# Patient Record
Sex: MALE | Race: WHITE | NOT HISPANIC OR LATINO | Employment: FULL TIME | URBAN - METROPOLITAN AREA
[De-identification: names, ages, dates, MRNs, and addresses within clinical notes are randomized per-mention and may not be internally consistent; named-entity substitution may affect disease eponyms.]

---

## 2018-03-29 ENCOUNTER — CONSULT (OUTPATIENT)
Dept: SLEEP MEDICINE | Facility: HOSPITAL | Age: 58
End: 2018-03-29

## 2018-03-29 VITALS
HEIGHT: 72 IN | SYSTOLIC BLOOD PRESSURE: 142 MMHG | BODY MASS INDEX: 32.51 KG/M2 | WEIGHT: 240 LBS | OXYGEN SATURATION: 98 % | DIASTOLIC BLOOD PRESSURE: 80 MMHG | HEART RATE: 86 BPM

## 2018-03-29 DIAGNOSIS — R06.83 SNORING: Primary | ICD-10-CM

## 2018-03-29 DIAGNOSIS — E66.09 CLASS 1 OBESITY DUE TO EXCESS CALORIES WITHOUT SERIOUS COMORBIDITY WITH BODY MASS INDEX (BMI) OF 32.0 TO 32.9 IN ADULT: ICD-10-CM

## 2018-03-29 DIAGNOSIS — G47.33 OBSTRUCTIVE SLEEP APNEA, ADULT: ICD-10-CM

## 2018-03-29 PROBLEM — I10 ESSENTIAL HYPERTENSION: Status: ACTIVE | Noted: 2018-03-29

## 2018-03-29 PROCEDURE — 99203 OFFICE O/P NEW LOW 30 MIN: CPT | Performed by: INTERNAL MEDICINE

## 2018-03-29 RX ORDER — LOVASTATIN 20 MG/1
20 TABLET ORAL NIGHTLY
COMMUNITY

## 2018-03-29 RX ORDER — MELOXICAM 15 MG/1
15 TABLET ORAL DAILY
COMMUNITY

## 2018-03-29 RX ORDER — LEVOTHYROXINE SODIUM 88 UG/1
88 TABLET ORAL DAILY
COMMUNITY

## 2018-03-29 RX ORDER — LOSARTAN POTASSIUM AND HYDROCHLOROTHIAZIDE 25; 100 MG/1; MG/1
1 TABLET ORAL DAILY
COMMUNITY
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
## 2018-04-10 ENCOUNTER — HOSPITAL ENCOUNTER (OUTPATIENT)
Dept: SLEEP MEDICINE | Facility: HOSPITAL | Age: 58
Discharge: HOME | End: 2018-04-10
Attending: INTERNAL MEDICINE | Admitting: INTERNAL MEDICINE

## 2018-04-10 VITALS
SYSTOLIC BLOOD PRESSURE: 139 MMHG | BODY MASS INDEX: 33.12 KG/M2 | WEIGHT: 244.49 LBS | HEIGHT: 72 IN | HEART RATE: 76 BPM | OXYGEN SATURATION: 94 % | DIASTOLIC BLOOD PRESSURE: 82 MMHG

## 2018-04-10 DIAGNOSIS — R06.83 SNORING: ICD-10-CM

## 2018-04-10 DIAGNOSIS — G47.33 OBSTRUCTIVE SLEEP APNEA, ADULT: ICD-10-CM

## 2018-04-10 PROCEDURE — 95810 POLYSOM 6/> YRS 4/> PARAM: CPT

## 2018-04-10 PROCEDURE — 95810 POLYSOM 6/> YRS 4/> PARAM: CPT | Performed by: INTERNAL MEDICINE

## 2018-04-11 DIAGNOSIS — G47.33 OBSTRUCTIVE SLEEP APNEA, ADULT: Primary | ICD-10-CM

## 2018-04-12 NOTE — PROGRESS NOTES
Patient called back and would like to beset up on pap therapy. [facility] for [facility] 4/12/2018